# Patient Record
Sex: FEMALE | Race: WHITE | NOT HISPANIC OR LATINO | Employment: OTHER | URBAN - METROPOLITAN AREA
[De-identification: names, ages, dates, MRNs, and addresses within clinical notes are randomized per-mention and may not be internally consistent; named-entity substitution may affect disease eponyms.]

---

## 2019-06-24 ENCOUNTER — TELEPHONE (OUTPATIENT)
Dept: MEDICAL GROUP | Facility: PHYSICIAN GROUP | Age: 69
End: 2019-06-24

## 2019-06-24 NOTE — TELEPHONE ENCOUNTER
Was the patient seen in the last year in this department? No     Does patient have an active prescription for medications requested? No     Received Request Via: Pharmacy    Pharmacy has requested a 100 day supply instead of 90.

## 2019-07-02 NOTE — TELEPHONE ENCOUNTER
Called pt again and she did not answer I left a voicemail as well as a callback number  -MICAELA Latham

## 2019-07-03 NOTE — TELEPHONE ENCOUNTER
Pt called and said that she has moved out of the area and has a new PCP now. Does not need to follow up or any refills from Dr. Schroeder. Thank you!